# Patient Record
Sex: FEMALE | Race: OTHER
[De-identification: names, ages, dates, MRNs, and addresses within clinical notes are randomized per-mention and may not be internally consistent; named-entity substitution may affect disease eponyms.]

---

## 2017-08-27 ENCOUNTER — HOSPITAL ENCOUNTER (EMERGENCY)
Dept: HOSPITAL 84 - D.ER | Age: 32
Discharge: HOME | End: 2017-08-27
Payer: MEDICAID

## 2017-08-27 DIAGNOSIS — R13.10: Primary | ICD-10-CM

## 2018-04-09 ENCOUNTER — HOSPITAL ENCOUNTER (EMERGENCY)
Dept: HOSPITAL 84 - D.ER | Age: 33
Discharge: HOME | End: 2018-04-09
Payer: MEDICAID

## 2018-04-09 DIAGNOSIS — K29.00: Primary | ICD-10-CM

## 2018-04-09 DIAGNOSIS — K21.9: ICD-10-CM

## 2018-04-09 DIAGNOSIS — F17.200: ICD-10-CM

## 2018-04-09 LAB — HCG SERPL-ACNC: NEGATIVE M[IU]/ML

## 2018-09-25 ENCOUNTER — HOSPITAL ENCOUNTER (EMERGENCY)
Dept: HOSPITAL 84 - D.ER | Age: 33
Discharge: HOME | End: 2018-09-25
Payer: MEDICAID

## 2018-09-25 VITALS — WEIGHT: 155.33 LBS | BODY MASS INDEX: 28.59 KG/M2 | HEIGHT: 62 IN

## 2018-09-25 VITALS — DIASTOLIC BLOOD PRESSURE: 86 MMHG | SYSTOLIC BLOOD PRESSURE: 120 MMHG

## 2018-09-25 DIAGNOSIS — R09.89: ICD-10-CM

## 2018-09-25 DIAGNOSIS — J02.9: ICD-10-CM

## 2018-09-25 DIAGNOSIS — F17.200: ICD-10-CM

## 2018-09-25 DIAGNOSIS — J01.90: Primary | ICD-10-CM

## 2019-09-02 ENCOUNTER — HOSPITAL ENCOUNTER (INPATIENT)
Dept: HOSPITAL 84 - D.ER | Age: 34
LOS: 3 days | Discharge: HOME | DRG: 690 | End: 2019-09-05
Attending: INTERNAL MEDICINE | Admitting: INTERNAL MEDICINE
Payer: MEDICAID

## 2019-09-02 VITALS — BODY MASS INDEX: 30.43 KG/M2 | HEIGHT: 62 IN | WEIGHT: 165.35 LBS | BODY MASS INDEX: 30.43 KG/M2

## 2019-09-02 DIAGNOSIS — F15.90: ICD-10-CM

## 2019-09-02 DIAGNOSIS — N39.0: Primary | ICD-10-CM

## 2019-09-02 DIAGNOSIS — I20.0: ICD-10-CM

## 2019-09-02 LAB
ERYTHROCYTE [DISTWIDTH] IN BLOOD BY AUTOMATED COUNT: 16.3 % (ref 11.5–14.5)
HCT VFR BLD CALC: 36.2 % (ref 36–48)
HGB BLD-MCNC: 11.9 G/DL (ref 12–16)
LYMPHOCYTES NFR BLD AUTO: 21 % (ref 15–50)
MCH RBC QN AUTO: 25.2 PG (ref 26–34)
MCHC RBC AUTO-ENTMCNC: 32.9 G/DL (ref 31–37)
MCV RBC: 76.7 FL (ref 80–100)
NEUTROPHILS NFR BLD AUTO: 73.9 % (ref 40–80)
PLATELET # BLD: 152 10X3/UL (ref 130–400)
PMV BLD AUTO: 11.5 FL (ref 7.4–10.4)
RBC # BLD AUTO: 4.72 10X6/UL (ref 4–5.4)
WBC # BLD AUTO: 6.1 10X3/UL (ref 4.8–10.8)

## 2019-09-03 VITALS — SYSTOLIC BLOOD PRESSURE: 113 MMHG | DIASTOLIC BLOOD PRESSURE: 64 MMHG

## 2019-09-03 VITALS — SYSTOLIC BLOOD PRESSURE: 114 MMHG | DIASTOLIC BLOOD PRESSURE: 62 MMHG

## 2019-09-03 VITALS — DIASTOLIC BLOOD PRESSURE: 49 MMHG | SYSTOLIC BLOOD PRESSURE: 83 MMHG

## 2019-09-03 VITALS — SYSTOLIC BLOOD PRESSURE: 106 MMHG | DIASTOLIC BLOOD PRESSURE: 58 MMHG

## 2019-09-03 VITALS — DIASTOLIC BLOOD PRESSURE: 69 MMHG | SYSTOLIC BLOOD PRESSURE: 120 MMHG

## 2019-09-03 VITALS — DIASTOLIC BLOOD PRESSURE: 43 MMHG | SYSTOLIC BLOOD PRESSURE: 93 MMHG

## 2019-09-03 VITALS — DIASTOLIC BLOOD PRESSURE: 40 MMHG | SYSTOLIC BLOOD PRESSURE: 125 MMHG

## 2019-09-03 VITALS — SYSTOLIC BLOOD PRESSURE: 123 MMHG | DIASTOLIC BLOOD PRESSURE: 46 MMHG

## 2019-09-03 LAB
ALBUMIN SERPL-MCNC: 3.8 G/DL (ref 3.4–5)
ALP SERPL-CCNC: 66 U/L (ref 46–116)
ALT SERPL-CCNC: 31 U/L (ref 10–68)
AMPHETAMINES UR QL SCN: POSITIVE QUAL
ANION GAP SERPL CALC-SCNC: 18.5 MMOL/L (ref 8–16)
APPEARANCE UR: (no result)
APTT BLD: 28.5 SECONDS (ref 22.8–39.4)
BACTERIA #/AREA URNS HPF: (no result) /HPF
BARBITURATES UR QL SCN: NEGATIVE QUAL
BENZODIAZ UR QL SCN: NEGATIVE QUAL
BILIRUB SERPL-MCNC: 1.23 MG/DL (ref 0.2–1.3)
BILIRUB SERPL-MCNC: NEGATIVE MG/DL
BUN SERPL-MCNC: 11 MG/DL (ref 7–18)
BZE UR QL SCN: NEGATIVE QUAL
CALCIUM SERPL-MCNC: 8.2 MG/DL (ref 8.5–10.1)
CANNABINOIDS UR QL SCN: POSITIVE QUAL
CHLORIDE SERPL-SCNC: 104 MMOL/L (ref 98–107)
CK MB SERPL-MCNC: 0.4 U/L (ref 0–3.6)
CK SERPL-CCNC: 149 UL (ref 21–215)
CO2 SERPL-SCNC: 22.1 MMOL/L (ref 21–32)
COLOR UR: YELLOW
CREAT SERPL-MCNC: 1.2 MG/DL (ref 0.6–1.3)
GLOBULIN SER-MCNC: 4.4 G/L
GLUCOSE SERPL-MCNC: 90 MG/DL (ref 74–106)
GLUCOSE SERPL-MCNC: NEGATIVE MG/DL
INR PPP: 1.24 (ref 0.85–1.17)
KETONES UR STRIP-MCNC: (no result) MG/DL
MAGNESIUM SERPL-MCNC: 1.5 MG/DL (ref 1.8–2.4)
NITRITE UR-MCNC: POSITIVE MG/ML
OPIATES UR QL SCN: NEGATIVE QUAL
OSMOLALITY SERPL CALC.SUM OF ELEC: 279 MOSM/KG (ref 275–300)
PCP UR QL SCN: NEGATIVE QUAL
PH UR STRIP: 7 [PH] (ref 5–6)
POTASSIUM SERPL-SCNC: 3.6 MMOL/L (ref 3.5–5.1)
PROT SERPL-MCNC: 8.2 G/DL (ref 6.4–8.2)
PROT UR-MCNC: (no result) MG/DL
PROTHROMBIN TIME: 15 SECONDS (ref 11.6–15)
RBC #/AREA URNS HPF: (no result) /HPF (ref 0–5)
SODIUM SERPL-SCNC: 141 MMOL/L (ref 136–145)
SP GR UR STRIP: 1 (ref 1–1.02)
SQUAMOUS #/AREA URNS HPF: (no result) /HPF (ref 0–5)
TROPONIN I SERPL-MCNC: 0.09 NG/ML (ref 0–0.06)
UROBILINOGEN UR-MCNC: 4 MG/DL
WBC #/AREA URNS HPF: (no result) /HPF (ref 0–5)

## 2019-09-03 NOTE — NUR
PATIENT COMPLAIN OF CHEST PRESSURE. NITRO SUB LINGUAL ADMINISTERED. PATIENT
STATES IT IS GONE. SITTING IN BED.

## 2019-09-03 NOTE — NUR
DIMITRI MEYER PAGED TO REQUEST TYLENOL ORDER FOR ELEVATED TEMP DUE TO UTI AS
 HAS CONSULTED GUI[facility]ERMO FOR MANAGEMENT OF UTI.

## 2019-09-03 NOTE — NUR
RECEIVED REPORT. ASSUMED CARE OF PATIENT. PATIENT SITTING UP IN BED.
ALERT/ORIENTED. CALL LIGHT WITHIN REACH. NO DISTRESS. PATIENT REPORTS CONSTANT
PRESSURE IN CHEST.  AT BEDSIDE FOR ROUNDS AT THIS TIME. WILL CONTINUE
TO MONITOR.

## 2019-09-03 NOTE — NUR
PT IS EXPRESSING CONCERN THAT "SHE IS NOT BEING TREATED PROPERLY" I HAVE SPOKE
WITH HER AND ATTEMPTED TO ANSWER HER QUESTIONS AND CONCERNS  I HAVE
TAKEN A CALL FROM HER MOTHER ON THE PHONE EXPRESSING CONCERNS AS WELL...PT HAS
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
SUPERVISOR NOW TWICE.

## 2019-09-03 NOTE — NUR
NEW ORDER FOR BACTRIM DS OBTAINED DUE TO PHARMACY WOULD NOT VERIFY ROCEPHIN
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 ORDER INPUTTED.

## 2019-09-03 NOTE — NUR
AWAKE AND ALERT WITH NO CP AT THIS TIME SKIN WARM AND DRY LCTA BED LOW AND
LOCKED PT HAS CALL LIGHT ASSISTANCE WITH COMFORT

## 2019-09-03 NOTE — NUR
RECIEVED REPORT FROM [facility]. PT ARRIVED IN HOSPITAL BED. VSS, AAOX4, NO
S/S OF RESPIRATORY DISTRESS. PT IS A GOOD HX. IV ACCESS INTACT. TELE ON. PT
RUNNING SINUS 64 ON TELE. PT DENIES ANY CHEST PAIN, STATES THE NITRO GIVEN IN
ER HELPED. ALTHOUGH, SHE STATES SHE HAS A MILD DISCOMFORT DUE TO PREVIOUS SOB.
PT DENIES ANY FURTHER NEEDS AT THIS TIME. WILL CTM. CL WITHIN REACH, BED IN
LOW, SR UP X2.

## 2019-09-04 VITALS — SYSTOLIC BLOOD PRESSURE: 106 MMHG | DIASTOLIC BLOOD PRESSURE: 66 MMHG

## 2019-09-04 VITALS — DIASTOLIC BLOOD PRESSURE: 61 MMHG | SYSTOLIC BLOOD PRESSURE: 96 MMHG

## 2019-09-04 VITALS — DIASTOLIC BLOOD PRESSURE: 47 MMHG | SYSTOLIC BLOOD PRESSURE: 91 MMHG

## 2019-09-04 VITALS — DIASTOLIC BLOOD PRESSURE: 52 MMHG | SYSTOLIC BLOOD PRESSURE: 95 MMHG

## 2019-09-04 VITALS — DIASTOLIC BLOOD PRESSURE: 53 MMHG | SYSTOLIC BLOOD PRESSURE: 101 MMHG

## 2019-09-04 VITALS — SYSTOLIC BLOOD PRESSURE: 115 MMHG | DIASTOLIC BLOOD PRESSURE: 58 MMHG

## 2019-09-04 LAB
ALBUMIN SERPL-MCNC: 3.3 G/DL (ref 3.4–5)
ALP SERPL-CCNC: 60 U/L (ref 46–116)
ALT SERPL-CCNC: 36 U/L (ref 10–68)
ANION GAP SERPL CALC-SCNC: 14.9 MMOL/L (ref 8–16)
BILIRUB SERPL-MCNC: 0.76 MG/DL (ref 0.2–1.3)
BUN SERPL-MCNC: 9 MG/DL (ref 7–18)
CALCIUM SERPL-MCNC: 7.8 MG/DL (ref 8.5–10.1)
CHLORIDE SERPL-SCNC: 103 MMOL/L (ref 98–107)
CO2 SERPL-SCNC: 22.2 MMOL/L (ref 21–32)
CREAT SERPL-MCNC: 1.1 MG/DL (ref 0.6–1.3)
GLOBULIN SER-MCNC: 4.8 G/L
GLUCOSE SERPL-MCNC: 90 MG/DL (ref 74–106)
OSMOLALITY SERPL CALC.SUM OF ELEC: 272 MOSM/KG (ref 275–300)
POTASSIUM SERPL-SCNC: 3.1 MMOL/L (ref 3.5–5.1)
PROT SERPL-MCNC: 8.1 G/DL (ref 6.4–8.2)
SODIUM SERPL-SCNC: 137 MMOL/L (ref 136–145)
TROPONIN I SERPL-MCNC: 0.03 NG/ML (ref 0–0.06)

## 2019-09-04 NOTE — NUR
RESTING IN BED WITH NO NEEDS NOTED OR STATED, ON HER PHONE. ON ROOM AIR. LEFT
FR. ALERT AND ORENTED ABLE TO VOICE NEEDS AND WANTS TO STAFF, WATER AND CALL
LIGHT IN REACH, BED LOW.

## 2019-09-05 VITALS — DIASTOLIC BLOOD PRESSURE: 57 MMHG | SYSTOLIC BLOOD PRESSURE: 103 MMHG

## 2019-09-05 VITALS — DIASTOLIC BLOOD PRESSURE: 59 MMHG | SYSTOLIC BLOOD PRESSURE: 96 MMHG

## 2019-09-05 VITALS — DIASTOLIC BLOOD PRESSURE: 64 MMHG | SYSTOLIC BLOOD PRESSURE: 103 MMHG

## 2019-09-05 VITALS — DIASTOLIC BLOOD PRESSURE: 57 MMHG | SYSTOLIC BLOOD PRESSURE: 105 MMHG

## 2019-09-05 LAB
ANION GAP SERPL CALC-SCNC: 15.5 MMOL/L (ref 8–16)
BASOPHILS NFR BLD AUTO: 0.6 % (ref 0–2)
BUN SERPL-MCNC: 9 MG/DL (ref 7–18)
CALCIUM SERPL-MCNC: 8 MG/DL (ref 8.5–10.1)
CHLORIDE SERPL-SCNC: 105 MMOL/L (ref 98–107)
CO2 SERPL-SCNC: 21 MMOL/L (ref 21–32)
CREAT SERPL-MCNC: 1 MG/DL (ref 0.6–1.3)
EOSINOPHIL NFR BLD: 1.1 % (ref 0–7)
ERYTHROCYTE [DISTWIDTH] IN BLOOD BY AUTOMATED COUNT: 15.7 % (ref 11.5–14.5)
GLUCOSE SERPL-MCNC: 78 MG/DL (ref 74–106)
HCT VFR BLD CALC: 35.2 % (ref 36–48)
HGB BLD-MCNC: 12.2 G/DL (ref 12–16)
IMM GRANULOCYTES NFR BLD: 0.3 % (ref 0–5)
LYMPHOCYTES NFR BLD AUTO: 37.4 % (ref 15–50)
MCH RBC QN AUTO: 25.3 PG (ref 26–34)
MCHC RBC AUTO-ENTMCNC: 34.7 G/DL (ref 31–37)
MCV RBC: 72.9 FL (ref 80–100)
MONOCYTES NFR BLD: 9.2 % (ref 2–11)
NEUTROPHILS NFR BLD AUTO: 51.4 % (ref 40–80)
OSMOLALITY SERPL CALC.SUM OF ELEC: 273 MOSM/KG (ref 275–300)
PLATELET # BLD: 157 10X3/UL (ref 130–400)
POTASSIUM SERPL-SCNC: 3.5 MMOL/L (ref 3.5–5.1)
RBC # BLD AUTO: 4.83 10X6/UL (ref 4–5.4)
SODIUM SERPL-SCNC: 138 MMOL/L (ref 136–145)
WBC # BLD AUTO: 3.5 10X3/UL (ref 4.8–10.8)

## 2019-09-05 NOTE — NUR
PT DISCHARGED HOME VIA WHEELCHAIR WITH FAMILY PIV REMOVED WITH CATHETER TIP
FULLY INTACT. PT SIGNED PROPER DISCHARGE INSTRUCTIONS AND REMOVED ALL
VALUABLES FROM THE ROOM. TLEMETRY REMOVED AND RETURNED.

## 2019-09-05 NOTE — NUR
REPORT RECEIVED. WILL CONTINUE WITH POC. PT CURRENTLY LYING ON LEFT SIDE. CALL
LIGHT W/I REACH. PT IS RESTING AT THIS TIME. NO S/S OF DISTRESS NOTED. L.FOR
PIV IS SALINE LOCKED. RR EVEN AND UNLABORED ON RA. PT DENIES ANY NEEDS. WILL
CTM.

## 2019-09-05 NOTE — MORECARE
CASE MANAGEMENT DISCHARGE SUMMARY
 
 
PATIENT: DREW AGUILAR                        UNIT: [id number]
ACCOUNT#: [id number]                       ADM DATE: 19
AGE: 34     : [birth year]  SEX: F            ROOM/BED: D.3935    
AUTHOR: ANGELIQUE,DOC                             PHYSICIAN:                               
 
REFERRING PHYSICIAN: RUTH WILLARD M.D.          
DATE OF SERVICE: 19
Discharge Plan
 
 
Patient Name: DREW AGUILAR
Facility: [facility]:[place]
Encounter #: [id number]
Medical Record #: [id number]
: 1985
Planned Disposition: Home
Anticipated Discharge Date: 19
 
Discharge Date: 2019
Expected LOS: 2
Initial Reviewer: CARISA
Initial Review Date: 2019
Generated: 19   6:37 pm 
Comments
 
DCP- Discharge Planning
 
Updated by [email or web address]: Tadeo Parrish on 19   4:35 pm CT
Patient Name: DREW AGUILAR                                     
Admission Status: ER   
Accout number: [id number]                              
Admission Date: 2019   
: 1985                                                        
Admission Diagnosis:   
Attending: RUTH WILLARD                                                
Current LOS:  2   
  
Anticipated DC Date: 2019   
Planned Disposition: Home   
Primary Insurance: MEDICAID [place]ENDING   
  
  
Discharge Planning Comments:   
CM MET WITH PT IN ROOM TO DISCUSS DISCHARGE PLANNING AND NEEDS. PT REPORTS 
LIVING AT HOME INDEPENDENTLY WITH HER ROOMMATE. PT HAS NO MEDICAL EQUIPMENT 
AND NO OUTSIDE SERVICES ASSISTING IN THE HOME. CM DISCUSSED AVAILABILITY OF 
HOME HEALTH, REHAB SERVICES AND MEDICAL EQUIPMENT. PT DENIES DISCHARGE NEEDS, 
 
REPORTS HER FRIEND WILL PICK HER UP FOR DISCHARGE HOME TODAY.  
  
  
: Tadeo Parrish
 DCPIA - Discharge Planning Initial Assessment
 
Updated by [id number]: Tadeo Parrish on 19   5:34 pm
*  Is the patient Alert and Oriented?
Yes
*  How many steps to enter\exit or inside your home?
 
*  PCP
DIMITRI BAKER
*  Pharmacy
[facility].
*  Preadmission Environment
Home with Family
*  ADLs
Independent
*  Equipment
None
*  Other Equipment
NO MEDICAL EQUIPMENT PROVIDER PREFERENCE
*  List name and contact numbers for known caregivers / representatives who 
currently or will assist patient after discharge:
MUNIRA SAUL, MOTHER, [phone]
*  Verbal permission to speak to the caregivers and representatives has been 
obtained from the patient.
N/A
*  Community resources currently utilized
None
*  Please name any agencies selected above.
NONE
*  Additional services required to return to the preadmission environment?
No
*  Can the patient safely return to the preadmission environment?
Yes
*  Has this patient been hospitalized within the prior 30 days at any 
hospital?
No
 
 
 
 
 
 
Patient Name: DREW AGUILAR
 
Encounter #: [id number]
Page [id number]
 
 
 
 
 
Electronically Signed by RICKIE MERINO on 19 at 1737
 
 
 
 
 
 
**All edits/amendments must be made on the electronic document**
 
DICTATION DATE: 19     : GRACE  19     
RPT#: [id number]                                DC DATE:19
                                               STATUS: DIS IN  
[facility]
[address] [place] [address]
***END OF REPORT***

## 2020-01-23 ENCOUNTER — HOSPITAL ENCOUNTER (EMERGENCY)
Dept: HOSPITAL 84 - D.ER | Age: 35
Discharge: HOME | End: 2020-01-23
Payer: MEDICAID

## 2020-01-23 VITALS — DIASTOLIC BLOOD PRESSURE: 52 MMHG | SYSTOLIC BLOOD PRESSURE: 107 MMHG

## 2020-01-23 VITALS — BODY MASS INDEX: 29.69 KG/M2 | WEIGHT: 161.34 LBS | HEIGHT: 62 IN

## 2020-01-23 DIAGNOSIS — J32.9: Primary | ICD-10-CM

## 2020-01-23 DIAGNOSIS — K12.0: ICD-10-CM

## 2020-01-31 ENCOUNTER — HOSPITAL ENCOUNTER (EMERGENCY)
Dept: HOSPITAL 84 - D.ER | Age: 35
Discharge: HOME | End: 2020-01-31
Payer: MEDICAID

## 2020-01-31 VITALS — SYSTOLIC BLOOD PRESSURE: 110 MMHG | DIASTOLIC BLOOD PRESSURE: 75 MMHG

## 2020-01-31 VITALS — BODY MASS INDEX: 29.51 KG/M2 | WEIGHT: 160.34 LBS | HEIGHT: 62 IN

## 2020-01-31 DIAGNOSIS — G44.009: Primary | ICD-10-CM

## 2020-03-12 ENCOUNTER — HOSPITAL ENCOUNTER (EMERGENCY)
Dept: HOSPITAL 84 - D.ER | Age: 35
Discharge: HOME | End: 2020-03-12
Payer: MEDICAID

## 2020-03-12 VITALS — DIASTOLIC BLOOD PRESSURE: 56 MMHG | SYSTOLIC BLOOD PRESSURE: 144 MMHG

## 2020-03-12 VITALS — HEIGHT: 62 IN | BODY MASS INDEX: 33.01 KG/M2 | WEIGHT: 179.38 LBS

## 2020-03-12 DIAGNOSIS — A08.4: Primary | ICD-10-CM

## 2020-03-12 LAB
ALBUMIN SERPL-MCNC: 3.8 G/DL (ref 3.4–5)
ALP SERPL-CCNC: 75 U/L (ref 30–120)
ALT SERPL-CCNC: 101 U/L (ref 10–68)
AMYLASE SERPL-CCNC: 98 U/L (ref 25–115)
ANION GAP SERPL CALC-SCNC: 11 MMOL/L (ref 8–16)
BACTERIA #/AREA URNS HPF: (no result) /HPF
BASOPHILS NFR BLD AUTO: 0.4 % (ref 0–2)
BILIRUB SERPL-MCNC: 0.41 MG/DL (ref 0.2–1.3)
BILIRUB SERPL-MCNC: NEGATIVE MG/DL
BUN SERPL-MCNC: 9 MG/DL (ref 7–18)
CALCIUM SERPL-MCNC: 8.8 MG/DL (ref 8.5–10.1)
CHLORIDE SERPL-SCNC: 105 MMOL/L (ref 98–107)
CO2 SERPL-SCNC: 26.6 MMOL/L (ref 21–32)
CREAT SERPL-MCNC: 0.9 MG/DL (ref 0.6–1.3)
EOSINOPHIL NFR BLD: 1.6 % (ref 0–7)
ERYTHROCYTE [DISTWIDTH] IN BLOOD BY AUTOMATED COUNT: 17.5 % (ref 11.5–14.5)
GLOBULIN SER-MCNC: 5.1 G/L
GLUCOSE SERPL-MCNC: 96 MG/DL (ref 74–106)
GLUCOSE SERPL-MCNC: NEGATIVE MG/DL
HCT VFR BLD CALC: 38 % (ref 36–48)
HGB BLD-MCNC: 12.6 G/DL (ref 12–16)
IMM GRANULOCYTES NFR BLD: 0.2 % (ref 0–5)
KETONES UR STRIP-MCNC: NEGATIVE MG/DL
LIPASE SERPL-CCNC: 260 U/L (ref 73–393)
LYMPHOCYTES NFR BLD AUTO: 31.1 % (ref 15–50)
MCH RBC QN AUTO: 24.7 PG (ref 26–34)
MCHC RBC AUTO-ENTMCNC: 33.2 G/DL (ref 31–37)
MCV RBC: 74.4 FL (ref 80–100)
MONOCYTES NFR BLD: 6.8 % (ref 2–11)
NEUTROPHILS NFR BLD AUTO: 59.9 % (ref 40–80)
NITRITE UR-MCNC: NEGATIVE MG/ML
OSMOLALITY SERPL CALC.SUM OF ELEC: 276 MOSM/KG (ref 275–300)
PH UR STRIP: 7 [PH] (ref 5–6)
PLATELET # BLD: 270 10X3/UL (ref 130–400)
PMV BLD AUTO: 10.8 FL (ref 7.4–10.4)
POTASSIUM SERPL-SCNC: 3.6 MMOL/L (ref 3.5–5.1)
PROT SERPL-MCNC: 8.9 G/DL (ref 6.4–8.2)
RBC # BLD AUTO: 5.11 10X6/UL (ref 4–5.4)
RBC #/AREA URNS HPF: (no result) /HPF (ref 0–5)
SODIUM SERPL-SCNC: 139 MMOL/L (ref 136–145)
SP GR UR STRIP: 1.01 (ref 1–1.02)
SQUAMOUS #/AREA URNS HPF: (no result) /HPF (ref 0–5)
TROPONIN I SERPL-MCNC: < 0.017 NG/ML (ref 0–0.06)
UROBILINOGEN UR-MCNC: NORMAL MG/DL
WBC # BLD AUTO: 9.2 10X3/UL (ref 4.8–10.8)
WBC #/AREA URNS HPF: (no result) /HPF